# Patient Record
Sex: FEMALE | Race: BLACK OR AFRICAN AMERICAN | NOT HISPANIC OR LATINO | Employment: UNEMPLOYED | URBAN - METROPOLITAN AREA
[De-identification: names, ages, dates, MRNs, and addresses within clinical notes are randomized per-mention and may not be internally consistent; named-entity substitution may affect disease eponyms.]

---

## 2017-03-23 ENCOUNTER — HOSPITAL ENCOUNTER (EMERGENCY)
Facility: HOSPITAL | Age: 37
Discharge: HOME | End: 2017-03-23
Attending: EMERGENCY MEDICINE
Payer: MEDICAID

## 2017-03-23 VITALS
HEIGHT: 62 IN | HEART RATE: 64 BPM | RESPIRATION RATE: 19 BRPM | OXYGEN SATURATION: 97 % | TEMPERATURE: 98 F | BODY MASS INDEX: 27.6 KG/M2 | SYSTOLIC BLOOD PRESSURE: 164 MMHG | DIASTOLIC BLOOD PRESSURE: 89 MMHG | WEIGHT: 150 LBS

## 2017-03-23 DIAGNOSIS — R06.02 SOB (SHORTNESS OF BREATH): ICD-10-CM

## 2017-03-23 DIAGNOSIS — I10 CHRONIC HYPERTENSION: Primary | ICD-10-CM

## 2017-03-23 LAB
ALBUMIN SERPL BCP-MCNC: 3.9 G/DL
ALP SERPL-CCNC: 97 U/L
ALT SERPL W/O P-5'-P-CCNC: 18 U/L
ANION GAP SERPL CALC-SCNC: 11 MMOL/L
AST SERPL-CCNC: 20 U/L
BASOPHILS # BLD AUTO: 0.02 K/UL
BASOPHILS NFR BLD: 0.3 %
BILIRUB SERPL-MCNC: 0.3 MG/DL
BNP SERPL-MCNC: 65 PG/ML
BUN SERPL-MCNC: 8 MG/DL
CALCIUM SERPL-MCNC: 9.7 MG/DL
CHLORIDE SERPL-SCNC: 108 MMOL/L
CO2 SERPL-SCNC: 20 MMOL/L
CREAT SERPL-MCNC: 0.9 MG/DL
D DIMER PPP IA.FEU-MCNC: 1.47 MG/L FEU
DIFFERENTIAL METHOD: ABNORMAL
EOSINOPHIL # BLD AUTO: 0.2 K/UL
EOSINOPHIL NFR BLD: 2.8 %
ERYTHROCYTE [DISTWIDTH] IN BLOOD BY AUTOMATED COUNT: 12.1 %
EST. GFR  (AFRICAN AMERICAN): >60 ML/MIN/1.73 M^2
EST. GFR  (NON AFRICAN AMERICAN): >60 ML/MIN/1.73 M^2
GLUCOSE SERPL-MCNC: 85 MG/DL
HCT VFR BLD AUTO: 38.6 %
HGB BLD-MCNC: 14 G/DL
LYMPHOCYTES # BLD AUTO: 2.2 K/UL
LYMPHOCYTES NFR BLD: 31.5 %
MCH RBC QN AUTO: 32 PG
MCHC RBC AUTO-ENTMCNC: 36.3 %
MCV RBC AUTO: 88 FL
MONOCYTES # BLD AUTO: 0.5 K/UL
MONOCYTES NFR BLD: 7.5 %
NEUTROPHILS # BLD AUTO: 4.1 K/UL
NEUTROPHILS NFR BLD: 57.8 %
PLATELET # BLD AUTO: 242 K/UL
PMV BLD AUTO: 9.5 FL
POTASSIUM SERPL-SCNC: 3.7 MMOL/L
PROT SERPL-MCNC: 7.1 G/DL
RBC # BLD AUTO: 4.38 M/UL
SODIUM SERPL-SCNC: 139 MMOL/L
TROPONIN I SERPL DL<=0.01 NG/ML-MCNC: <0.006 NG/ML
WBC # BLD AUTO: 7.1 K/UL

## 2017-03-23 PROCEDURE — 99284 EMERGENCY DEPT VISIT MOD MDM: CPT | Mod: 25

## 2017-03-23 PROCEDURE — 80053 COMPREHEN METABOLIC PANEL: CPT

## 2017-03-23 PROCEDURE — 84484 ASSAY OF TROPONIN QUANT: CPT

## 2017-03-23 PROCEDURE — 85379 FIBRIN DEGRADATION QUANT: CPT

## 2017-03-23 PROCEDURE — 96374 THER/PROPH/DIAG INJ IV PUSH: CPT

## 2017-03-23 PROCEDURE — 85025 COMPLETE CBC W/AUTO DIFF WBC: CPT

## 2017-03-23 PROCEDURE — 63600175 PHARM REV CODE 636 W HCPCS: Performed by: EMERGENCY MEDICINE

## 2017-03-23 PROCEDURE — 83880 ASSAY OF NATRIURETIC PEPTIDE: CPT

## 2017-03-23 PROCEDURE — 93005 ELECTROCARDIOGRAM TRACING: CPT

## 2017-03-23 PROCEDURE — 25500020 PHARM REV CODE 255: Performed by: EMERGENCY MEDICINE

## 2017-03-23 RX ORDER — LABETALOL 100 MG/1
100 TABLET, FILM COATED ORAL 2 TIMES DAILY
COMMUNITY
End: 2022-07-11 | Stop reason: SDUPTHER

## 2017-03-23 RX ORDER — HYDRALAZINE HYDROCHLORIDE 20 MG/ML
10 INJECTION INTRAMUSCULAR; INTRAVENOUS
Status: COMPLETED | OUTPATIENT
Start: 2017-03-23 | End: 2017-03-23

## 2017-03-23 RX ADMIN — IOHEXOL 100 ML: 350 INJECTION, SOLUTION INTRAVENOUS at 08:03

## 2017-03-23 RX ADMIN — HYDRALAZINE HYDROCHLORIDE 10 MG: 20 INJECTION INTRAMUSCULAR; INTRAVENOUS at 07:03

## 2017-03-23 NOTE — ED TRIAGE NOTES
Pt arrived to Ed with complaints of chest pain, SOB and right sided facial twitching. Pt states she does not always take home medicine labetalol at home. Last time she took medicine was on yesterday

## 2017-03-23 NOTE — ED AVS SNAPSHOT
[facility]  [address] [place] [address]               Elvie Salvador   3/23/2017  6:08 PM   ED    Description:  Female : 1980   Department:  [facility]           Your Care was Coordinated By:     Provider Role From To    Star Gomez MD Attending Provider 17 1823 --      Reason for Visit     Shortness of Breath     Chest Pain           Diagnoses this Visit        Comments    Chronic hypertension    -  Primary     SOB (shortness of breath)           ED Disposition     ED Disposition Condition Comment    Discharge             To Do List           Follow-up Information     Schedule an appointment as soon as possible for a visit with [facility].    Specialty:  Family Medicine    Contact information:    [address], [address]  [facility] [address] [phone]      [facility] [place] [facility]     [facility] Care [facility] -  Assistance  Registered nurses in the [facility] provide clinical advisement, health education, appointment booking, and other advisory services.  Call for this free service at [phone].             Medications           Message regarding Medications     Verify the changes and/or additions to your medication regime listed below are the same as discussed with your clinician today.  If any of these changes or additions are incorrect, please notify your healthcare provider.        These medications were administered today        Dose Freq    hydrALAZINE injection 10 mg 10 mg ED 1 Time    Sig: Inject 0.5 mLs (10 mg total) into the vein ED 1 Time.    Class: Normal    Route: Intravenous    omnipaque 350 iohexol 100 mL 100 mL IMG once as needed    Sig: Inject 100 mLs into the vein ONCE PRN for contrast.    Class: Normal    Route: Intravenous    omnipaque 350 iohexol 350 mg iodine/mL      Notes to Pharmacy: Created by cabinet override      STOP taking these medications   "   fluticasone (FLONASE) 50 mcg/actuation nasal spray 1 spray by Each Nare route 2 (two) times daily as needed for Rhinitis.    methylPREDNISolone (MEDROL DOSEPACK) 4 mg tablet use as directed           Verify that the below list of medications is an accurate representation of the medications you are currently taking.  If none reported, the list may be blank. If incorrect, please contact your healthcare provider. Carry this list with you in case of emergency.           Current Medications     labetalol (NORMODYNE) 100 MG tablet Take 100 mg by mouth 2 (two) times daily.           Clinical Reference Information           Your Vitals Were     BP Pulse Temp Resp Height Weight    156/88 79 98.4 °F (36.9 °C) (Oral) 19 5' 2" (1.575 m) 68 kg (150 lb)    Last Period SpO2 BMI          01/23/2015 99% 27.44 kg/m2        Allergies as of 3/23/2017        Reactions    Ibuprofen Anaphylaxis    Peanut Anaphylaxis    2 years ago last exacerbation, pt states her throat swells but has not been treated in an ER for same.      Immunizations Administered on Date of Encounter - 3/23/2017     None      ED Micro, Lab, POCT     Start Ordered       Status Ordering Provider    03/23/17 1855 03/23/17 1854  Troponin I  STAT      Final result     03/23/17 1855 03/23/17 1854  Brain natriuretic peptide  STAT      Final result     03/23/17 1855 03/23/17 1854  D dimer, quantitative  STAT      Final result     03/23/17 1854 03/23/17 1854  CBC auto differential  STAT      Final result     03/23/17 1854 03/23/17 1854  Comprehensive metabolic panel  STAT      Final result       ED Imaging Orders     Start Ordered       Status Ordering Provider    03/23/17 2007 03/23/17 2006  CTA Chest Non-Coronary - PE Study  1 time imaging      Final result     03/23/17 1855 03/23/17 1854  X-Ray Chest PA And Lateral  1 time imaging      Final result         Discharge Instructions       Take your medications as prescribed.  Follow up with your primary care physician 2-3 " days for further management of your hypertension.  Please refer to the additional material providing further information including when return to the emergency department.    Discharge References/Attachments     HYPERTENSION, ESTABLISHED (ENGLISH)      [employer] Sign-Up     Activating your [facility] account is as easy as 1-2-3!     1) Visit [email or web address], select Sign Up Now, enter this activation code and your date of birth, then select Next.  [email or web address]  Expires: 5/7/2017  9:37 PM      2) Create a username and password to use when you visit [facility] in the future and select a security question in case you lose your password and select Next.    3) Enter your e-mail address and click Sign Up!    Additional Information  If you have questions, please e-mail [email or web address] or call [phone] to talk to our [facility] staff. Remember, [facility] is NOT to be used for urgent needs. For medical emergencies, dial 911.          [facility] complies with applicable Federal civil rights laws and does not discriminate on the basis of race, color, national origin, age, disability, or sex.        Language Assistance Services     ATTENTION: Language assistance services are available, free of charge. Please call [phone].      ATENCIÓN: Si habla español, tiene a gayle disposición servicios gratuitos de asistencia lingüística. Llame al [phone].     CHÚ Ý: N?u b?n nói Ti?ng Vi?t, có các d?ch v? h? tr? ngôn ng? mi?n phí dành cho b?n. G?i s? [phone].

## 2017-03-23 NOTE — ED NOTES
Patient has verified the spelling of their name and  on armband  LOC: The patient is awake, alert, and aware of environment with an appropriate affect, the patient is oriented x 3 and speaking appropriately.   APPEARANCE: Patient resting comfortably and in no acute distress, patient is clean and well groomed, patient's clothing is properly fastened.   SKIN: The skin is warm and dry, color consistent with ethnicity, patient has normal skin turgor and moist mucus membranes, skin intact, no breakdown or bruising noted.   : denies any problems urinating  MUSCULOSKELETAL: Patient moving all extremities spontaneously, right side facial twitching.   RESPIRATORY: Airway is open and patent, respirations are spontaneous, patient has a normal effort and rate, no accessory muscle use noted, bilateral breath sounds clear, complaints of SOB, O2 sats at 100% room air    ABDOMEN: Soft and non tender to palpation, no distention noted, normoactive bowel sounds present in all four quadrants, pt says she usually have problems having bowel movements and uses a stool softner.   CARDIAC: Normal rate and rhythm, no peripheral edema noted, less then 3 second capillary refill, complaints of  chest pain, denies any nausea vomiting

## 2017-03-24 NOTE — ED PROVIDER NOTES
"Encounter Date: 3/23/2017       History     Chief Complaint   Patient presents with    Shortness of Breath     accompanied by "heart racing"; x1 week; denies recent illness;     Chest Pain     x1 week; left sided; denies radiation     Review of patient's allergies indicates:   Allergen Reactions    Ibuprofen Anaphylaxis    Peanut Anaphylaxis     2 years ago last exacerbation, pt states her throat swells but has not been treated in an ER for same.     HPI Comments: CHIEF COMPLAINT: Shortness of breath    HISTORY OF PRESENT ILLNESS: This is a 36-year-old -American female with a history of hypertension who is noncompliant with her medications or presents to the emergency department today with complaint of shortness of breath.  She reports she's been feeling increasingly short of breath for some time now.  She reports that it tends to wake her up from sleep.  It will, on throughout the day.  She has had no recent cough.  No sputum production.  No hemoptysis.  No recent long travel.  No unilateral leg swelling.  No chest pain.  No palpitations.  Her blood pressure is significantly high here today in the emergency department.  She does report that she only takes her medications when she feels that she needs it.      REVIEW OF SYSTEMS:  Constitutional: No fever, no chills.  Eyes: No discharge. No pain.  HENT: No nasal drainage. No ear ache. No sore throat.  Cardiovascular: See above.  Respiratory: See above.  Gastrointestinal: No abdominal pain, no vomiting. No diarrhea.  Genitourinary: No hematuria, dysuria, urgency.  Musculoskeletal: No back pain.  Skin: No rashes, no lesions.  Neurological: No headache, no focal weakness.    ALLERGIES reviewed  Family history reviewed  Home medications reviewed  Problem list reviewed        The history is provided by the patient and the spouse.     Past Medical History:   Diagnosis Date    Abnormal Pap smear     Hypertension     Sickle cell anemia     trait     Past " Surgical History:   Procedure Laterality Date    HYSTERECTOMY       History reviewed. No pertinent family history.  Social History   Substance Use Topics    Smoking status: Current Every Day Smoker    Smokeless tobacco: Never Used    Alcohol use Yes     Review of Systems   All other systems reviewed and are negative.      Physical Exam   Initial Vitals   BP Pulse Resp Temp SpO2   03/23/17 1802 03/23/17 1802 03/23/17 1802 03/23/17 1802 03/23/17 1802   227/103 70 20 98.2 °F (36.8 °C) 99 %     Physical Exam    Nursing note and vitals reviewed.  Constitutional: She appears well-developed and well-nourished.   HENT:   Head: Normocephalic and atraumatic.   Nose: Nose normal.   Mouth/Throat: Oropharynx is clear and moist.   Eyes: Conjunctivae and EOM are normal. Pupils are equal, round, and reactive to light. No scleral icterus.   Neck: Normal range of motion. Neck supple. No JVD present.   Cardiovascular: Normal rate, regular rhythm, normal heart sounds and intact distal pulses. Exam reveals no gallop and no friction rub.    No murmur heard.  Pulmonary/Chest: Breath sounds normal. No stridor. No respiratory distress. She has no wheezes. She exhibits no tenderness.   Abdominal: Soft. Bowel sounds are normal. She exhibits no distension and no mass. There is no tenderness. There is no rebound and no guarding.   Musculoskeletal: Normal range of motion. She exhibits no edema or tenderness.   Neurological: She is alert and oriented to person, place, and time. She has normal strength. No cranial nerve deficit.   Skin: Skin is warm and dry. No rash noted. No pallor.   Psychiatric: She has a normal mood and affect. Thought content normal.         ED Course   Procedures  Labs Reviewed   CBC W/ AUTO DIFFERENTIAL - Abnormal; Notable for the following:        Result Value    MCH 32.0 (*)     MCHC 36.3 (*)     All other components within normal limits   COMPREHENSIVE METABOLIC PANEL - Abnormal; Notable for the following:     CO2 20  (*)     All other components within normal limits   D DIMER, QUANTITATIVE - Abnormal; Notable for the following:     D-Dimer 1.47 (*)     All other components within normal limits   TROPONIN I   B-TYPE NATRIURETIC PEPTIDE     EKG Readings: (Independently Interpreted)   Initial Reading: No STEMI.   Rate 63, normal sinus rhythm with sinus arrhythmia, normal axis, no ST elevations, no T-wave inversions       X-Rays: Other Radiology Reports: Images reviewed by myself, read by radiology,     Imaging Results         CTA Chest Non-Coronary - PE Study (Final result)    Abnormal Result time:  03/23/17 20:36:55    Final result by Lisa Francisco MD (03/23/17 20:36:55)    Impression:        1. No acute cardiopulmonary abnormality identified.  Specifically, negative pulmonary embolus study.    2. Mediastinal and right hilar nonspecific lymphadenopathy. Correlate clinically. Further evaluation/followup as warranted.              EPIC notification system was activated.      Electronically signed by: LISA FRANCISCO MD, MD  Date:     03/23/17  Time:    20:36     Narrative:    Chest CT with contrast.    Comparison: Chest radiograph earlier same date and a CT thorax 7/7/10    Technique: 1.25 mm axial images of the chest were obtained after the administration of 100 cc of Omnipaque 350 IV contrast.  This is a PE protocol.    Results:Timing of contrast bolus is adequate. No evidence of pulmonary infarction or pulmonary thromboembolism.    There is a left sided arch.  No evidence for aneurysm or dissection.  The heart is normal in size without significant pericardial fluid.  No evidence for cardiac thrombus.  The pulmonary trunk and bilateral main pulmonary arteries are normal in size.    The esophagus appears normal in caliber and contour along its course.  No axillary or left hilar lymphadenopathy. A few scattered mildly enlarged mediastinal lymph nodes measuring up to 10 mm at the AP window. Solitary enlarged right hilar lymph node  measuring up to 15 mm.    The trachea and major bronchi are patent. Lungs are symmetrically normally inflated and clear. No pleural effusion or pneumothorax.    The structures at the base of the neck are without significant abnormality.  The extrathoracic soft tissues and the images of the upper abdomen are without significant abnormality.    Included osseous structures appear intact.            X-Ray Chest PA And Lateral (Final result) Result time:  03/23/17 19:33:25    Final result by Roni Robles MD (03/23/17 19:33:25)    Impression:         No acute findings in the chest.          Electronically signed by: RONI ROBLES MD  Date:     03/23/17  Time:    19:33     Narrative:    Chest PA and Lateral    Indication:.    Comparison:July 7, 2010.    Findings:     The cardiomediastinal silhouette is within normal limits.  There is no pleural effusion.  The trachea is midline.  The lungs are symmetrically expanded bilaterally without evidence of acute parenchymal process.  There is no pneumothorax.  The osseous structures are unremarkable.              Medical Decision Making:   Initial Assessment:   Will assess the patient for shortness of breath including labs, chest x-ray, depending on the results of her d-dimer possible CTA.  Differential Diagnosis:   Bronchitis, pneumonia, reactive airway, asthma, COPD, congestive heart failure, PE, MI.  Independently Interpreted Test(s):   I have ordered and independently interpreted EKG Reading(s) - see prior notes  Clinical Tests:   Lab Tests: Ordered and Reviewed  Radiological Study: Ordered and Reviewed  Medical Tests: Ordered and Reviewed  ED Management:  The pt presents with shortness of breath and elevated BP, she is noncompliant with her BP meds. Her workup today does not show any sign of ACS, PE, dissection. We have obtained improvement in her blood pressure following her medications.  Had a long discussion with her about the need to be compliant with her  medications.  We will also discussed follow-up with primary care.  She is assigned to a primary care physician however have also given her resources for [facility] family medicine in case she for she cannot follow up with her primary care physician.  The patient is comfortable going home and comfortable with this plan.  Discharge                   ED Course    the patient is feeling improved.  Her blood pressure is improving following medications.  Her d-dimer is significantly elevated now getting a CTA to rule out pulmonary embolism.  I discussed this with the patient and she agrees.    Patient is still feeling improved.  Blood pressure still improving.  CTA negative.  I discussed results of the workup with the patient and the need for follow-up with primary care for further blood pressure management.  The patient understands and will comply.  She is comfortable going home at this time.  Clinical Impression:   The primary encounter diagnosis was Chronic hypertension. A diagnosis of SOB (shortness of breath) was also pertinent to this visit.    Disposition:   Disposition: Discharged  Condition: Stable       Star Gomez MD  03/23/17 [id number]

## 2017-03-24 NOTE — DISCHARGE INSTRUCTIONS
Take your medications as prescribed.  Follow up with your primary care physician 2-3 days for further management of your hypertension.  Please refer to the additional material providing further information including when return to the emergency department.

## 2018-07-17 ENCOUNTER — HOSPITAL ENCOUNTER (EMERGENCY)
Facility: HOSPITAL | Age: 38
Discharge: HOME | End: 2018-07-17
Attending: EMERGENCY MEDICINE | Admitting: EMERGENCY MEDICINE
Payer: MEDICAID

## 2018-07-17 VITALS
WEIGHT: 145 LBS | DIASTOLIC BLOOD PRESSURE: 76 MMHG | TEMPERATURE: 97 F | OXYGEN SATURATION: 100 % | RESPIRATION RATE: 16 BRPM | HEART RATE: 69 BPM | BODY MASS INDEX: 26.68 KG/M2 | SYSTOLIC BLOOD PRESSURE: 136 MMHG | HEIGHT: 62 IN

## 2018-07-17 DIAGNOSIS — G43.909 MIGRAINE WITHOUT STATUS MIGRAINOSUS, NOT INTRACTABLE, UNSPECIFIED MIGRAINE TYPE: Primary | ICD-10-CM

## 2018-07-17 PROCEDURE — 96375 TX/PRO/DX INJ NEW DRUG ADDON: CPT

## 2018-07-17 PROCEDURE — 99284 EMERGENCY DEPT VISIT MOD MDM: CPT | Mod: 25

## 2018-07-17 PROCEDURE — 96374 THER/PROPH/DIAG INJ IV PUSH: CPT

## 2018-07-17 PROCEDURE — 63600175 PHARM REV CODE 636 W HCPCS: Performed by: EMERGENCY MEDICINE

## 2018-07-17 RX ORDER — BUTALBITAL, ACETAMINOPHEN AND CAFFEINE 50; 325; 40 MG/1; MG/1; MG/1
1 TABLET ORAL EVERY 4 HOURS PRN
Qty: 20 TABLET | Refills: 0 | Status: SHIPPED | OUTPATIENT
Start: 2018-07-17 | End: 2018-08-16

## 2018-07-17 RX ORDER — SUMATRIPTAN 50 MG/1
50 TABLET, FILM COATED ORAL EVERY 4 HOURS PRN
Qty: 20 TABLET | Refills: 0 | Status: SHIPPED | OUTPATIENT
Start: 2018-07-17 | End: 2018-08-16

## 2018-07-17 RX ORDER — DIPHENHYDRAMINE HYDROCHLORIDE 50 MG/ML
25 INJECTION INTRAMUSCULAR; INTRAVENOUS
Status: COMPLETED | OUTPATIENT
Start: 2018-07-17 | End: 2018-07-17

## 2018-07-17 RX ORDER — PROCHLORPERAZINE EDISYLATE 5 MG/ML
10 INJECTION INTRAMUSCULAR; INTRAVENOUS
Status: COMPLETED | OUTPATIENT
Start: 2018-07-17 | End: 2018-07-17

## 2018-07-17 RX ADMIN — PROCHLORPERAZINE EDISYLATE 10 MG: 5 INJECTION INTRAMUSCULAR; INTRAVENOUS at 06:07

## 2018-07-17 RX ADMIN — DIPHENHYDRAMINE HYDROCHLORIDE 25 MG: 50 INJECTION, SOLUTION INTRAMUSCULAR; INTRAVENOUS at 06:07

## 2018-07-17 NOTE — ED PROVIDER NOTES
"Encounter Date: 7/17/2018    SCRIBE #1 NOTE: I, Mariely Dave, am scribing for, and in the presence of,  Dr. Fitzgerald. I have scribed the entire note.       History     Chief Complaint   Patient presents with    Headache     headache that began 5 days ago in back of scalp and radiates to the front right eye, , dizzy with nausea,  states  " im losing my train of thought " since saturday      Time seen by the provider: 6:08 PM    This is a 37 y.o. female with a past medical history of Abnormal Pap smear; Hypertension; and Sickle cell anemia who presents to the Emergency Department with a headache x 5 days. The headache is described as constant, sharp, progressively worsening and severe. Symptoms are associated with dizziness, "losing trains of thought," and nausea. This headache is different than any prior headaches she's ever had. Pt denies fever or vomiting.  Symptoms are relieved by nothing; she has tried Tylenol and Fioricet. Patient has no prior history of similar symptoms. She reports a family history of aneurysm and brain tumors. Pt has a past surgical history that includes Hysterectomy.        The history is provided by the patient.     Review of patient's allergies indicates:   Allergen Reactions    Ibuprofen Anaphylaxis    Peanut Anaphylaxis     2 years ago last exacerbation, pt states her throat swells but has not been treated in an ER for same.     Past Medical History:   Diagnosis Date    Abnormal Pap smear     Hypertension     Sickle cell anemia     trait     Past Surgical History:   Procedure Laterality Date    HYSTERECTOMY       No family history on file.  Social History   Substance Use Topics    Smoking status: Current Every Day Smoker    Smokeless tobacco: Never Used    Alcohol use Yes     Review of Systems   Constitutional: Negative for activity change, appetite change, chills, diaphoresis, fatigue and fever.   HENT: Negative for sore throat.    Respiratory: Negative for cough, chest " "tightness and shortness of breath.    Cardiovascular: Negative for chest pain and palpitations.   Gastrointestinal: Positive for nausea. Negative for abdominal distention, abdominal pain, diarrhea and vomiting.   Endocrine: Negative for polydipsia and polyphagia.   Genitourinary: Negative for difficulty urinating, dysuria and flank pain.   Musculoskeletal: Positive for neck stiffness. Negative for arthralgias.   Skin: Negative for pallor and rash.   Neurological: Positive for dizziness and headaches.        Positive for "losing train of thought."   Psychiatric/Behavioral: Negative for confusion.   All other systems reviewed and are negative.      Physical Exam     Initial Vitals [07/17/18 1707]   BP Pulse Resp Temp SpO2   (!) 147/97 89 16 97.3 °F (36.3 °C) 99 %      MAP       --         Physical Exam    Nursing note and vitals reviewed.  Constitutional: She appears well-developed and well-nourished. She is not diaphoretic. No distress.   HENT:   Head: Normocephalic and atraumatic.   Mouth/Throat: Oropharynx is clear and moist.   Eyes: Conjunctivae and EOM are normal. Pupils are equal, round, and reactive to light. No scleral icterus.   Neck: Normal range of motion. Neck supple.   No nuchal rigidity   Cardiovascular: Normal rate, regular rhythm and normal heart sounds.   Pulmonary/Chest: Breath sounds normal. No respiratory distress.   Abdominal: Soft. Bowel sounds are normal. There is no tenderness.   Musculoskeletal: Normal range of motion. She exhibits no edema or tenderness.   Neurological: She is alert and oriented to person, place, and time. No cranial nerve deficit.   Moving all extremities   Skin: Skin is warm and dry. No rash noted. No erythema.   Psychiatric: She has a normal mood and affect. Her behavior is normal.         ED Course   Procedures  Labs Reviewed - No data to display       Imaging Results          CT Head Without Contrast (Final result)  Result time 07/17/18 19:27:22    Final result by Jarocho " MARCY Francisco MD (07/17/18 19:27:22)                 Impression:      No acute intracranial abnormality.      Electronically signed by: Jarocho Francisco MD  Date:    07/17/2018  Time:    19:27             Narrative:    EXAMINATION:  CT HEAD WITHOUT CONTRAST    CLINICAL HISTORY:  Headache, acute, norm neuro exam;    TECHNIQUE:  Low dose axial CT images obtained throughout the head without intravenous contrast. Sagittal and coronal reconstructions were performed.    COMPARISON:  None.    FINDINGS:  Intracranial compartment:    Ventricles and sulci are normal in size for age without evidence of hydrocephalus. No extra-axial blood or fluid collections.    The brain parenchyma appears normal. No parenchymal mass, hemorrhage, edema or major vascular distribution infarct.    Skull/extracranial contents (limited evaluation): No fracture. Mastoid air cells and paranasal sinuses are essentially clear.  Visualized portions of the orbits are within normal limits.                                 Medical Decision Making:   ED Management:  Pt has normal ct. Feels better after compazine. Will dc w fioricet and f/u w neuro              Attending Attestation:           Physician Attestation for Scribe:  Physician Attestation Statement for Scribe #1: I, Parrish Fitzgerald, reviewed documentation, as scribed by Mariely Dave in my presence, and it is both accurate and complete.                    Clinical Impression:     1. Migraine without status migrainosus, not intractable, unspecified migraine type            Disposition:   Disposition: Discharged  Condition: Stable                        Parrish Fitzgerald MD  07/17/18 1959

## 2018-07-17 NOTE — ED NOTES
APPEARANCE: Alert, oriented and in no acute distress.  CARDIAC: Normal rate and rhythm, no murmur heard.   PERIPHERAL VASCULAR: peripheral pulses present. Normal cap refill. No edema. Warm to touch.    RESPIRATORY:Normal rate and effort, breath sounds clear bilaterally throughout chest. Respirations are equal and unlabored no obvious signs of distress.  GASTRO: soft, bowel sounds normal, no tenderness, no abdominal distention.  MUSC: Full ROM. No bony tenderness or soft tissue tenderness. No obvious deformity.  SKIN: Skin is warm and dry, normal skin turgor, mucous membranes moist.  NEURO: 5/5 strength major flexors/extensors bilaterally. Sensory intact to light touch bilaterally. Clarence coma scale: eyes open spontaneously-4, oriented & converses-5, obeys commands-6. No neurological abnormalities. R sided headache, endorses dizziness, blurred vision  MENTAL STATUS: awake, alert and aware of environment.  EYE: PERRL, both eyes: pupils brisk and reactive to light. Normal size.  ENT: EARS: no obvious drainage. NOSE: no active bleeding.

## 2018-07-17 NOTE — ED NOTES
"When asked for urine sample for UPT, pt replied "I already told them I had a hysterectomy." pt endorses total hysterectomy in the past. Amilcar MAGANA aware. CT called and notified. Pt medicated for headache. Will continue to monitor pt.   "

## 2018-07-17 NOTE — ED NOTES
C/o worsening headache since Thursday, endorses improved somewhat with BP meds, now c/o R sided headache starting from neck and radiating towards forehead, pt endorses more forgetful, dizziness, and blurred vision. Denies numbness/tingling,  equal bilaterally, gait steady to room. Will continue to monitor pt.

## 2018-07-18 NOTE — ED NOTES
Pt appears to be resting . Respirations even and unlabored. Equal rise and fall of chest noted. Skin warm and dry. Pt easily aroused to verbal stimulation. Will continue to monitor.

## 2018-12-11 DIAGNOSIS — Z12.31 VISIT FOR SCREENING MAMMOGRAM: Primary | ICD-10-CM

## 2018-12-21 ENCOUNTER — HOSPITAL ENCOUNTER (OUTPATIENT)
Dept: RADIOLOGY | Facility: HOSPITAL | Age: 38
Discharge: HOME | End: 2018-12-21
Attending: FAMILY MEDICINE
Payer: MEDICAID

## 2018-12-21 DIAGNOSIS — Z12.31 VISIT FOR SCREENING MAMMOGRAM: ICD-10-CM

## 2018-12-21 PROCEDURE — 77067 SCR MAMMO BI INCL CAD: CPT | Mod: 26,,, | Performed by: RADIOLOGY

## 2018-12-21 PROCEDURE — 77067 SCR MAMMO BI INCL CAD: CPT | Mod: TC

## 2022-07-11 ENCOUNTER — HOSPITAL ENCOUNTER (EMERGENCY)
Facility: HOSPITAL | Age: 42
Discharge: HOME | End: 2022-07-11
Attending: EMERGENCY MEDICINE
Payer: MEDICAID

## 2022-07-11 VITALS
SYSTOLIC BLOOD PRESSURE: 203 MMHG | TEMPERATURE: 99 F | BODY MASS INDEX: 27.05 KG/M2 | OXYGEN SATURATION: 99 % | HEIGHT: 62 IN | DIASTOLIC BLOOD PRESSURE: 98 MMHG | HEART RATE: 62 BPM | RESPIRATION RATE: 18 BRPM | WEIGHT: 147 LBS

## 2022-07-11 DIAGNOSIS — R06.02 SHORTNESS OF BREATH: ICD-10-CM

## 2022-07-11 DIAGNOSIS — Z91.148 NONCOMPLIANCE WITH MEDICATIONS: ICD-10-CM

## 2022-07-11 DIAGNOSIS — I10 BENIGN ESSENTIAL HTN: Primary | ICD-10-CM

## 2022-07-11 DIAGNOSIS — R07.9 CHEST PAIN: ICD-10-CM

## 2022-07-11 LAB
ALBUMIN SERPL BCP-MCNC: 4.1 G/DL (ref 3.5–5.2)
ALP SERPL-CCNC: 106 U/L (ref 55–135)
ALT SERPL W/O P-5'-P-CCNC: 20 U/L (ref 10–44)
ANION GAP SERPL CALC-SCNC: 12 MMOL/L (ref 8–16)
AST SERPL-CCNC: 24 U/L (ref 10–40)
BASOPHILS # BLD AUTO: 0.04 K/UL (ref 0–0.2)
BASOPHILS NFR BLD: 0.6 % (ref 0–1.9)
BILIRUB SERPL-MCNC: 0.4 MG/DL (ref 0.1–1)
BNP SERPL-MCNC: 19 PG/ML (ref 0–99)
BUN SERPL-MCNC: 9 MG/DL (ref 6–20)
CALCIUM SERPL-MCNC: 9.6 MG/DL (ref 8.7–10.5)
CHLORIDE SERPL-SCNC: 107 MMOL/L (ref 95–110)
CO2 SERPL-SCNC: 21 MMOL/L (ref 23–29)
CREAT SERPL-MCNC: 0.8 MG/DL (ref 0.5–1.4)
DIFFERENTIAL METHOD: ABNORMAL
EOSINOPHIL # BLD AUTO: 0.4 K/UL (ref 0–0.5)
EOSINOPHIL NFR BLD: 6 % (ref 0–8)
ERYTHROCYTE [DISTWIDTH] IN BLOOD BY AUTOMATED COUNT: 11.4 % (ref 11.5–14.5)
EST. GFR  (AFRICAN AMERICAN): >60 ML/MIN/1.73 M^2
EST. GFR  (NON AFRICAN AMERICAN): >60 ML/MIN/1.73 M^2
GLUCOSE SERPL-MCNC: 90 MG/DL (ref 70–110)
HCT VFR BLD AUTO: 40.3 % (ref 37–48.5)
HGB BLD-MCNC: 14.2 G/DL (ref 12–16)
IMM GRANULOCYTES # BLD AUTO: 0.01 K/UL (ref 0–0.04)
IMM GRANULOCYTES NFR BLD AUTO: 0.1 % (ref 0–0.5)
LYMPHOCYTES # BLD AUTO: 2.2 K/UL (ref 1–4.8)
LYMPHOCYTES NFR BLD: 32.9 % (ref 18–48)
MCH RBC QN AUTO: 32.5 PG (ref 27–31)
MCHC RBC AUTO-ENTMCNC: 35.2 G/DL (ref 32–36)
MCV RBC AUTO: 92 FL (ref 82–98)
MONOCYTES # BLD AUTO: 0.4 K/UL (ref 0.3–1)
MONOCYTES NFR BLD: 5.8 % (ref 4–15)
NEUTROPHILS # BLD AUTO: 3.6 K/UL (ref 1.8–7.7)
NEUTROPHILS NFR BLD: 54.6 % (ref 38–73)
NRBC BLD-RTO: 0 /100 WBC
PLATELET # BLD AUTO: 276 K/UL (ref 150–450)
PMV BLD AUTO: 8.9 FL (ref 9.2–12.9)
POTASSIUM SERPL-SCNC: 3.5 MMOL/L (ref 3.5–5.1)
PROT SERPL-MCNC: 7.6 G/DL (ref 6–8.4)
RBC # BLD AUTO: 4.37 M/UL (ref 4–5.4)
SODIUM SERPL-SCNC: 140 MMOL/L (ref 136–145)
TROPONIN I SERPL DL<=0.01 NG/ML-MCNC: <0.006 NG/ML (ref 0–0.03)
TSH SERPL DL<=0.005 MIU/L-ACNC: 1.89 UIU/ML (ref 0.4–4)
WBC # BLD AUTO: 6.68 K/UL (ref 3.9–12.7)

## 2022-07-11 PROCEDURE — 99285 EMERGENCY DEPT VISIT HI MDM: CPT | Mod: 25

## 2022-07-11 PROCEDURE — 83880 ASSAY OF NATRIURETIC PEPTIDE: CPT | Performed by: NURSE PRACTITIONER

## 2022-07-11 PROCEDURE — 93010 ELECTROCARDIOGRAM REPORT: CPT | Mod: ,,, | Performed by: INTERNAL MEDICINE

## 2022-07-11 PROCEDURE — 85025 COMPLETE CBC W/AUTO DIFF WBC: CPT | Performed by: NURSE PRACTITIONER

## 2022-07-11 PROCEDURE — 25000003 PHARM REV CODE 250: Performed by: EMERGENCY MEDICINE

## 2022-07-11 PROCEDURE — 84443 ASSAY THYROID STIM HORMONE: CPT | Performed by: NURSE PRACTITIONER

## 2022-07-11 PROCEDURE — 80053 COMPREHEN METABOLIC PANEL: CPT | Performed by: NURSE PRACTITIONER

## 2022-07-11 PROCEDURE — 84484 ASSAY OF TROPONIN QUANT: CPT | Performed by: NURSE PRACTITIONER

## 2022-07-11 PROCEDURE — 93005 ELECTROCARDIOGRAM TRACING: CPT

## 2022-07-11 PROCEDURE — 93010 EKG 12-LEAD: ICD-10-PCS | Mod: ,,, | Performed by: INTERNAL MEDICINE

## 2022-07-11 RX ORDER — FUROSEMIDE 20 MG/1
20 TABLET ORAL DAILY
Qty: 30 TABLET | Refills: 0 | Status: SHIPPED | OUTPATIENT
Start: 2022-07-11 | End: 2022-08-10

## 2022-07-11 RX ORDER — FUROSEMIDE 20 MG/1
20 TABLET ORAL
Status: COMPLETED | OUTPATIENT
Start: 2022-07-11 | End: 2022-07-11

## 2022-07-11 RX ORDER — LABETALOL 100 MG/1
100 TABLET, FILM COATED ORAL
Status: COMPLETED | OUTPATIENT
Start: 2022-07-11 | End: 2022-07-11

## 2022-07-11 RX ORDER — LABETALOL HYDROCHLORIDE 5 MG/ML
10 INJECTION, SOLUTION INTRAVENOUS
Status: DISCONTINUED | OUTPATIENT
Start: 2022-07-11 | End: 2022-07-11

## 2022-07-11 RX ORDER — LABETALOL 100 MG/1
100 TABLET, FILM COATED ORAL 2 TIMES DAILY
Qty: 60 TABLET | Refills: 0 | Status: SHIPPED | OUTPATIENT
Start: 2022-07-11 | End: 2022-08-10

## 2022-07-11 RX ADMIN — FUROSEMIDE 20 MG: 20 TABLET ORAL at 07:07

## 2022-07-11 RX ADMIN — LABETALOL HYDROCHLORIDE 100 MG: 100 TABLET, FILM COATED ORAL at 07:07

## 2022-07-11 NOTE — Clinical Note
"Elvie Loeracarol Salvador was seen and treated in our emergency department on 7/11/2022.  She may return to work on 07/13/2022.       If you have any questions or concerns, please don't hesitate to call.      Frederic Wheat MD"

## 2022-07-12 NOTE — ED PROVIDER NOTES
"Encounter Date: 7/11/2022       History     Chief Complaint   Patient presents with    Chest Pain     Left-sided, anterior, localized CP that started today. 8/10 pain. NO BP med taken today.     Shortness of Breath     At rest x2 days.     Headache     Generalized HA that started today . No visual disturbances.      Elvie Salvador is a 41 y.o. female who  has a past medical history of Abnormal Pap smear, Hypertension, and Sickle cell anemia.    The patient presents to the ED due to multiple concerns.  She describes chest pain, shortness of breath, palpitations, headache, and elevated blood pressure that has been ongoing for the last several days.  She admits she has been feeling more stressed lately and describes a "situation" that required her to suddenly move.  As result, she has been without her blood pressure medications for the last 5 months.  She had a small amount of labetalol, so she has been taking it only once a day instead of twice a day as prescribed.  She has been out of furosemide.  She tried to take a dose of her anxiety medication today, but she states it did not help her symptoms.  She denies any vision changes, focal weakness/numbness, fever, cough, fall/syncope, or any other concerns.        Review of patient's allergies indicates:   Allergen Reactions    Ibuprofen Anaphylaxis    Peanut Anaphylaxis     2 years ago last exacerbation, pt states her throat swells but has not been treated in an ER for same.     Past Medical History:   Diagnosis Date    Abnormal Pap smear     Hypertension     Sickle cell anemia     trait     Past Surgical History:   Procedure Laterality Date    HYSTERECTOMY      OOPHORECTOMY       No family history on file.  Social History     Tobacco Use    Smoking status: Current Every Day Smoker    Smokeless tobacco: Never Used   Substance Use Topics    Alcohol use: Yes    Drug use: No     Review of Systems   Constitutional: Negative for chills and fever.   HENT: " Negative for sore throat.    Respiratory: Positive for shortness of breath. Negative for cough.    Cardiovascular: Positive for chest pain and palpitations.   Gastrointestinal: Negative for nausea and vomiting.   Genitourinary: Negative for dysuria, frequency and urgency.   Musculoskeletal: Negative for back pain.   Skin: Negative for rash and wound.   Neurological: Negative for syncope and weakness.   Hematological: Does not bruise/bleed easily.   Psychiatric/Behavioral: Negative for agitation, behavioral problems and confusion. The patient is nervous/anxious.        Physical Exam     Initial Vitals [07/11/22 1740]   BP Pulse Resp Temp SpO2   (S) (!) 235/106 82 20 99.1 °F (37.3 °C) 99 %      MAP       --         Physical Exam    Nursing note and vitals reviewed.  Constitutional: She appears well-developed and well-nourished. She is not diaphoretic. No distress.   Well-appearing, no distress.   HENT:   Head: Normocephalic and atraumatic.   Mouth/Throat: Oropharynx is clear and moist.   Eyes: EOM are normal. Pupils are equal, round, and reactive to light.   Neck: No tracheal deviation present.   Cardiovascular: Normal rate, regular rhythm, normal heart sounds and intact distal pulses.   Pulmonary/Chest: Breath sounds normal. No stridor. No respiratory distress. She has no wheezes.   Abdominal: Abdomen is soft. Bowel sounds are normal. She exhibits no distension and no mass. There is no abdominal tenderness.   Musculoskeletal:         General: No edema. Normal range of motion.     Neurological: She is alert and oriented to person, place, and time. She has normal strength. No cranial nerve deficit or sensory deficit.   Skin: Skin is warm and dry. Capillary refill takes less than 2 seconds. No pallor.   Psychiatric: She has a normal mood and affect. Her behavior is normal. Thought content normal.         ED Course   Procedures  Labs Reviewed   CBC W/ AUTO DIFFERENTIAL - Abnormal; Notable for the following components:        Result Value    MCH 32.5 (*)     RDW 11.4 (*)     MPV 8.9 (*)     All other components within normal limits   COMPREHENSIVE METABOLIC PANEL - Abnormal; Notable for the following components:    CO2 21 (*)     All other components within normal limits   TROPONIN I   B-TYPE NATRIURETIC PEPTIDE   TSH     EKG Readings: (Independently Interpreted)   Initial Reading: No STEMI. Previous EKG: Compared with most recent EKG Rhythm: Normal Sinus Rhythm.   Normal sinus rhythm, rate 75, no ST changes or evidence of ischemia, normal intervals.  Stable from March 2017.        Imaging Results          X-Ray Chest PA And Lateral (Final result)  Result time 07/11/22 20:08:31    Final result by Jacky Lees MD (07/11/22 20:08:31)                 Impression:      No acute process.      Electronically signed by: Jacky Lees MD  Date:    07/11/2022  Time:    20:08             Narrative:    EXAMINATION:  XR CHEST PA AND LATERAL    CLINICAL HISTORY:  Chest Pain;    TECHNIQUE:  PA and lateral views of the chest were performed.    COMPARISON:  03/23/2017.    FINDINGS:  Monitoring EKG leads are present.  The trachea is unremarkable.  The cardiomediastinal silhouette is within normal limits.  The hemidiaphragms are unremarkable.  There are no pleural effusions.  There is no evidence of a pneumothorax.  There is no evidence of pneumomediastinum.  No airspace opacity is present.  The osseous structures are unremarkable.                                 Medications   furosemide tablet 20 mg (20 mg Oral Given 7/11/22 1959)   labetaloL tablet 100 mg (100 mg Oral Given 7/11/22 1959)     Medical Decision Making:   Initial Assessment:   41-year-old female with history of hypertension presents to ER with multiple complaints including chest pain, shortness of breath, headache, palpitations over the last several days.  Not compliant with medications recently.  Hypertensive on arrival, vitals otherwise reassuring.  Exam benign.  Will obtain cardiac  evaluation, labs, CXR, give home medications, and reassess.  Differential Diagnosis:   Differential Diagnosis includes, but is not limited to:  Hypertensive encephalopathy, CVA/TIA, intracranial hemorrhage, MI/ACS, aortic/carotid artery dissection, AAA, hypertensive nephropathy, medication non-compliance, anxiety, drug abuse/intoxication, essential hypertension    Clinical Tests:   Lab Tests: Ordered and Reviewed  Radiological Study: Reviewed and Ordered  Medical Tests: Reviewed and Ordered  ED Management:  After complete evaluation, including thorough history and physical exam, the patient was found to have asymptomatic elevated blood pressure, likely secondary to chronic uncontrolled HTN and medication non-compliance.   EKG without ischemia, labs unremarkable, troponin negative.     The patient's symptoms are not consistent with SAH/intracranial bleed. Physical exam is benign without focal weakness, sensory deficit, or cerebellar dysfunction to suggest acute stroke or intracranial mass. There is no meningismus, fever, or other evidence of infection to suggest meningitis/encephalitis. There is no other evidence of end-organ damage consistent with hypertensive urgency/emergency at time of this evaluation. I do not feel further ED evaluation or intervention is warranted. The patient was given home BP medications and BP medications were refilled. The patient was counseled extensively on medication compliance and was instructed to follow-up with a PCP for further management of elevated BP.  Patient states she has an appointment with her PCP tomorrow.      On re-evaluation, the patient's status has improved.  After complete ED evaluation, clinical impression is most consistent with HTN.  PCP follow-up within 2-3 days was recommended.    After taking into careful account the patient's history, physical exam findings, as well as empirical and objective data obtained throughout ED workup, I feel no emergent medical  condition has been identified. No further evaluation or admission was felt to be required, and the patient is stable for discharge from the ED. The patient and any additional family present were updated with test results, overall clinical impression, and recommended further plan of care, including discharge instructions as provided and outpatient follow-up for continued evaluation and management as needed. All questions were answered. The patient expressed understanding and agreed with current plan for discharge and follow-up plan of care. Strict ED return precautions were provided, including return/worsening of current symptoms, new symptoms, or any other concerns.                        Clinical Impression:   Final diagnoses:  [R07.9] Chest pain  [I10] Benign essential HTN (Primary)  [R06.02] Shortness of breath  [Z91.14] Noncompliance with medications          ED Disposition Condition    Discharge Stable        ED Prescriptions     Medication Sig Dispense Start Date End Date Auth. Provider    furosemide (LASIX) 20 MG tablet Take 1 tablet (20 mg total) by mouth once daily. 30 tablet 7/11/2022 8/10/2022 Frederic Wheat MD    labetaloL (NORMODYNE) 100 MG tablet Take 1 tablet (100 mg total) by mouth 2 (two) times daily. 60 tablet 7/11/2022 8/10/2022 Frederic Wheat MD        Follow-up Information     Follow up With Specialties Details Why Contact Info Additional Information    [facility] Family Medicine Schedule an appointment as soon as possible for a visit   [address], [address]  [place] [address] [phone] Please park in [address] or [address] and use [address]ano. Take Medical Office Bldg. elevators.           Frederic Wheat MD  07/11/22 1719

## 2022-07-12 NOTE — ED NOTES
Pt reports total resolution of CP at this time. BP remains elevated at time of discharge, pt is asymptomatic. Home medications given. Dr. Wheat aware of BP, okay to d/c pt home at this time.

## 2022-08-16 DIAGNOSIS — Z12.31 ENCOUNTER FOR SCREENING MAMMOGRAM FOR MALIGNANT NEOPLASM OF BREAST: Primary | ICD-10-CM

## 2023-01-18 ENCOUNTER — HOSPITAL ENCOUNTER (OUTPATIENT)
Dept: RADIOLOGY | Facility: HOSPITAL | Age: 43
Discharge: HOME | End: 2023-01-18
Attending: FAMILY MEDICINE
Payer: MEDICAID

## 2023-01-18 DIAGNOSIS — Z12.31 ENCOUNTER FOR SCREENING MAMMOGRAM FOR MALIGNANT NEOPLASM OF BREAST: ICD-10-CM

## 2023-01-18 PROCEDURE — 77067 MAMMO DIGITAL SCREENING BILAT WITH TOMO: ICD-10-PCS | Mod: 26,,, | Performed by: RADIOLOGY

## 2023-01-18 PROCEDURE — 77067 SCR MAMMO BI INCL CAD: CPT | Mod: 26,,, | Performed by: RADIOLOGY

## 2023-01-18 PROCEDURE — 77063 MAMMO DIGITAL SCREENING BILAT WITH TOMO: ICD-10-PCS | Mod: 26,,, | Performed by: RADIOLOGY

## 2023-01-18 PROCEDURE — 77063 BREAST TOMOSYNTHESIS BI: CPT | Mod: 26,,, | Performed by: RADIOLOGY

## 2023-01-18 PROCEDURE — 77067 SCR MAMMO BI INCL CAD: CPT | Mod: TC

## 2023-04-18 ENCOUNTER — TELEPHONE (OUTPATIENT)
Dept: GASTROENTEROLOGY | Facility: CLINIC | Age: 43
End: 2023-04-18
Payer: MEDICAID

## 2023-04-18 NOTE — TELEPHONE ENCOUNTER
"Attempt to schedule gi clinic appt from [facility] for gerd.  Pt has clinic number, states "will call when I have my work schedule".  "

## 2023-09-01 DIAGNOSIS — Z12.31 SCREENING MAMMOGRAM, ENCOUNTER FOR: Primary | ICD-10-CM

## 2024-07-17 ENCOUNTER — TELEPHONE (OUTPATIENT)
Dept: CARDIOLOGY | Facility: CLINIC | Age: 44
End: 2024-07-17
Payer: MEDICAID

## 2024-07-17 NOTE — TELEPHONE ENCOUNTER
----- Message from Arnold Chapman MA sent at 7/17/2024  4:32 PM CDT -----  Regarding: FW: Referral    ----- Message -----  From: Diane Torres  Sent: 7/17/2024   4:17 PM CDT  To: [facility] Staff  Subject: Referral                                         Good morning,    Dr. Dottie Cash would like to refer the following patient to Dr. Nicole in the Cardiology department. The patients diagnosis is I10 Hypertension. I have scanned the patients referral and records into .     Thanks,    Diane MATSON  [facility]

## 2024-07-28 NOTE — PROGRESS NOTES
"[facility] Cardiology [address]     SUBJECTIVE:     History of Present Illness:  Patient is a 43 y.o. female presents for evaluation. Unclear why Seen by  1/23 for chest pain and hypertension and palpitations ; no real spot for the chest pains but feels like a strain in the heart from left side      Primary Diagnosis:   Hypertension: positive  DM: none  Smoker: positive  Family history of early CAD: none  Heart disease : no MI or heart failure   ROS  Blood pressures are always high - 207 at times  No chest pains  Occasional feeling of her heart beat stopping - worse at night  Also has noted her heart beat fast  No syncope  Occasional shortness of breath: (breathing is off) - sometimes at night  Activity: works 2 jobs: lifting, works in kitchen; does get a little short of breath; sometimes feels a little chest pain     Review of patient's allergies indicates:   Allergen Reactions    Ibuprofen Anaphylaxis    Peanut Anaphylaxis     2 years ago last exacerbation, pt states her throat swells but has not been treated in an ER for same.       Past Medical History:   Diagnosis Date    Abnormal Pap smear     Hypertension     Sickle cell anemia     trait       Past Surgical History:   Procedure Laterality Date    HYSTERECTOMY      OOPHORECTOMY             Past Hospitalization:         Cardiac meds:    Felodipine 10 mg  Lasix 20 mg  Hydralazine 25 mg BID  Labetolol 100 mg BID  Pravastatin 20 mg - not daily use feels bad       OBJECTIVE:     Vital Signs (Most Recent)  Vitals:    07/31/24 1414   BP: (!) 174/117   Pulse: 74   SpO2: 99%   Weight: 73.9 kg (162 lb 14.7 oz)   Height: 5' 2" (1.575 m)         Physical Exam:  Neck: normal carotids, no bruits; normal JVP  Lungs :clear  Heart: RR, normal S1,S2, soft systolic  ejection murmur, no gallops  Abd: no masses; no bruits;   Exts: normal DP and PT pulses bilaterally, normal radials; no edema noted               Labs    2022: TSH normal; CMP normal; CBC normal  Diagnostic " Results:    1.EK: normal   2.Echo: : normal EF; mild AI, TR, mild MR EJH; aortic sclerosis   3. Stress test: : 6 METS; negative EJH - see no imaging for this   4. cath  5. US carotids: : plaque <50%  Chart review:          ASSESSMENT/PLAN:     Hypertension: uncontrolled  Lipids unknown  Carotid plaque  Noncardiac chest pains with negative workup in the past  Palpitations: sounds like extrasystoles    Plan: 1. Needs a statin - talk to primary  2. Increase labetolol 200 mg BID  3. Followup with primary regarding hypertension  4. No need to repeat any cardiac testing  5. Stop smoking  6. Return prn       Alfredo Nicole MD

## 2024-07-31 ENCOUNTER — OFFICE VISIT (OUTPATIENT)
Dept: CARDIOLOGY | Facility: CLINIC | Age: 44
End: 2024-07-31
Payer: MEDICAID

## 2024-07-31 VITALS
BODY MASS INDEX: 29.98 KG/M2 | WEIGHT: 162.94 LBS | OXYGEN SATURATION: 99 % | SYSTOLIC BLOOD PRESSURE: 174 MMHG | HEART RATE: 74 BPM | DIASTOLIC BLOOD PRESSURE: 117 MMHG | HEIGHT: 62 IN

## 2024-07-31 DIAGNOSIS — I10 PRIMARY HYPERTENSION: Primary | ICD-10-CM

## 2024-07-31 PROCEDURE — 3077F SYST BP >= 140 MM HG: CPT | Mod: CPTII,,, | Performed by: INTERNAL MEDICINE

## 2024-07-31 PROCEDURE — 1159F MED LIST DOCD IN RCRD: CPT | Mod: CPTII,,, | Performed by: INTERNAL MEDICINE

## 2024-07-31 PROCEDURE — 3080F DIAST BP >= 90 MM HG: CPT | Mod: CPTII,,, | Performed by: INTERNAL MEDICINE

## 2024-07-31 PROCEDURE — 99999 PR PBB SHADOW E&M-EST. PATIENT-LVL III: CPT | Mod: PBBFAC,,, | Performed by: INTERNAL MEDICINE

## 2024-07-31 PROCEDURE — 99204 OFFICE O/P NEW MOD 45 MIN: CPT | Mod: S$PBB,,, | Performed by: INTERNAL MEDICINE

## 2024-07-31 PROCEDURE — 3008F BODY MASS INDEX DOCD: CPT | Mod: CPTII,,, | Performed by: INTERNAL MEDICINE

## 2024-07-31 PROCEDURE — 1160F RVW MEDS BY RX/DR IN RCRD: CPT | Mod: CPTII,,, | Performed by: INTERNAL MEDICINE

## 2024-07-31 PROCEDURE — 99213 OFFICE O/P EST LOW 20 MIN: CPT | Mod: PBBFAC,PN | Performed by: INTERNAL MEDICINE

## 2024-07-31 RX ORDER — FLUOCINONIDE 0.5 MG/G
1 CREAM TOPICAL 2 TIMES DAILY
COMMUNITY
Start: 2024-07-22

## 2024-07-31 RX ORDER — CEPHALEXIN 500 MG/1
500 CAPSULE ORAL 2 TIMES DAILY
COMMUNITY
Start: 2024-07-18

## 2024-07-31 RX ORDER — ALPRAZOLAM 0.5 MG/1
0.5 TABLET ORAL DAILY PRN
COMMUNITY
Start: 2024-07-03

## 2024-07-31 RX ORDER — HYDRALAZINE HYDROCHLORIDE 25 MG/1
25 TABLET, FILM COATED ORAL 2 TIMES DAILY
COMMUNITY
Start: 2024-07-03

## 2024-07-31 RX ORDER — OMEPRAZOLE 40 MG/1
40 CAPSULE, DELAYED RELEASE ORAL DAILY PRN
COMMUNITY
Start: 2024-07-03

## 2024-07-31 RX ORDER — PRAVASTATIN SODIUM 20 MG/1
20 TABLET ORAL NIGHTLY
COMMUNITY
Start: 2024-07-03

## 2024-07-31 RX ORDER — LABETALOL 200 MG/1
200 TABLET, FILM COATED ORAL EVERY 12 HOURS
Qty: 180 TABLET | Refills: 1 | Status: SHIPPED | OUTPATIENT
Start: 2024-07-31 | End: 2024-08-30

## 2024-07-31 RX ORDER — FELODIPINE 10 MG/1
10 TABLET, EXTENDED RELEASE ORAL DAILY
COMMUNITY
Start: 2024-07-03

## 2024-07-31 RX ORDER — LINACLOTIDE 145 UG/1
145 CAPSULE, GELATIN COATED ORAL
COMMUNITY
Start: 2024-07-30

## 2025-04-29 ENCOUNTER — HOSPITAL ENCOUNTER (EMERGENCY)
Facility: HOSPITAL | Age: 45
Discharge: HOME | End: 2025-04-29
Attending: EMERGENCY MEDICINE
Payer: MEDICAID

## 2025-04-29 VITALS
RESPIRATION RATE: 16 BRPM | WEIGHT: 164 LBS | BODY MASS INDEX: 30.18 KG/M2 | HEIGHT: 62 IN | OXYGEN SATURATION: 99 % | TEMPERATURE: 98 F | DIASTOLIC BLOOD PRESSURE: 105 MMHG | SYSTOLIC BLOOD PRESSURE: 200 MMHG | HEART RATE: 82 BPM

## 2025-04-29 DIAGNOSIS — I10 HYPERTENSION, UNSPECIFIED TYPE: ICD-10-CM

## 2025-04-29 DIAGNOSIS — L02.01 FACIAL ABSCESS: Primary | ICD-10-CM

## 2025-04-29 PROCEDURE — 10160 PNXR ASPIR ABSC HMTMA BULLA: CPT

## 2025-04-29 PROCEDURE — 99283 EMERGENCY DEPT VISIT LOW MDM: CPT | Mod: 25

## 2025-04-29 RX ORDER — SULFAMETHOXAZOLE AND TRIMETHOPRIM 800; 160 MG/1; MG/1
1 TABLET ORAL 2 TIMES DAILY
Qty: 14 TABLET | Refills: 0 | Status: SHIPPED | OUTPATIENT
Start: 2025-04-29 | End: 2025-05-06

## 2025-04-29 NOTE — ED NOTES
Patient presented to the Ed with complaints of localized facial swelling under her left eye, states it's been like that for five months, she started noting drainage on Sunday and pulsating pain, states she uses warm compress with out any relief, she denies any fever,chills, no other complaints at this time, states she took blood pressure medicine this morning before she got here, patient is alert, oriented *4, denies needs at this time.

## 2025-04-29 NOTE — ED NOTES
Face cleaned with two by two, no drainage noted at this time, swelling subsided, patient tolerated the procedure well.

## 2025-04-29 NOTE — ED PROVIDER NOTES
Encounter Date: 4/29/2025       History     Chief Complaint   Patient presents with    Facial Swelling     C/o localized facial swelling x5 months. Pt reports white discharge. Applying topical ointment w/o relief.   Pt HTN in triage 200's/100's. Pt reports taking daily HTN meds as prescribed.      44-year-old female with history of hypertension presents to ED with concern of localized swelling and abscess to left side of face.  Patient states she has noticed a small painless bump for nearly 5 months.  She states 3 days ago she noticed area becoming more swollen so she squeezed it, noticing small amount of white discharge.  Later that day area began to swell more with surrounding erythema and worsening pain.  No fevers or chills.  No pain with eye movement.  No other acute complaints at this time    The history is provided by the patient.     Review of patient's allergies indicates:   Allergen Reactions    Ibuprofen Anaphylaxis    Peanut Anaphylaxis     2 years ago last exacerbation, pt states her throat swells but has not been treated in an ER for same.     Past Medical History:   Diagnosis Date    Abnormal Pap smear     Hypertension     Sickle cell anemia     trait     Past Surgical History:   Procedure Laterality Date    HYSTERECTOMY      OOPHORECTOMY       No family history on file.  Social History[1]  Review of Systems   Constitutional:  Negative for chills and fever.   Eyes:  Negative for pain and visual disturbance.   Skin:  Positive for color change and wound.       Physical Exam     Initial Vitals [04/29/25 0755]   BP Pulse Resp Temp SpO2   (!) 200/105 82 16 98 °F (36.7 °C) 99 %      MAP       --         Physical Exam    Vitals reviewed.  Constitutional: Vital signs are normal. She appears well-developed and well-nourished. She is cooperative. She does not have a sickly appearance. She does not appear ill. No distress.   HENT:   Head: Normocephalic and atraumatic.       Small, 1 cm superficial and fluctuant  abscess noted to left side of face.  +mild surrounding erythema.  No significant periorbital edema at this time.  No pain with EOM.   Eyes: EOM are normal.   Neck:   Normal range of motion.  Musculoskeletal:      Cervical back: Normal range of motion.     Neurological: She is alert and oriented to person, place, and time. GCS eye subscore is 4. GCS verbal subscore is 5. GCS motor subscore is 6.   Psychiatric: She has a normal mood and affect. Her speech is normal and behavior is normal.         ED Course   Abscess Aspiration    Date/Time: 4/29/2025 8:24 AM    Performed by: Frank Potter PA-C  Authorized by: Frederic Boucher MD    Consent Done?:  Yes  Universal Protocol:     Verbal consent obtained?: Yes    A time out verifies correct patient, procedure, equipment, support staff and site/side marked as required:   Procedure Details:     Site prepped with:  Betadine    Location of Abscess #1:  Left face    Size of needle #1:  18  Post-procedure:     Patient tolerance:  Patient tolerated the procedure well with no immediate complications    Labs Reviewed - No data to display       Imaging Results    None          Medications - No data to display  Medical Decision Making  Patient presents with concern of left-sided facial abscess.  Afebrile.  Patient in no distress on exam    DDx:  Including but not limited to abscess, cellulitis, wound check    Risk  Prescription drug management.               ED Course as of 04/29/25 0825   Tue Apr 29, 2025   0819 Bedside needle aspiration was performed with purulent drainage.  Patient tolerated well.  Prescription written for Bactrim.  Encouraged to continue monitoring wound healing closely with close outpatient follow-up.  ED return precautions were discussed.  Patient states understanding and agrees with plan [KS]   0820 Patient was noted to have elevated blood pressure while in the ED today.  The patient has no associated signs or symptoms of hypertension.  Patient's blood  pressure is likely elevated due to situation and noncompliance with hypertensive medications.  Advised blood pressure recheck by PCP within 1 week.     [KS]      ED Course User Index  [KS] Frank Potter PA-C                           Clinical Impression:  Final diagnoses:  [L02.01] Facial abscess (Primary)  [I10] Hypertension, unspecified type          ED Disposition Condition    Discharge Stable          ED Prescriptions       Medication Sig Dispense Start Date End Date Auth. Provider    sulfamethoxazole-trimethoprim 800-160mg (BACTRIM DS) 800-160 mg Tab Take 1 tablet by mouth 2 (two) times daily. for 7 days 14 tablet 4/29/2025 5/6/2025 Frank Potter PA-C          Follow-up Information       Follow up With Specialties Details Why Contact Info    Your Doctor                   [1]   Social History  Tobacco Use    Smoking status: Every Day    Smokeless tobacco: Never   Substance Use Topics    Alcohol use: Yes    Drug use: No        Frank Potter PA-C  04/29/25 [id number]

## 2025-04-29 NOTE — DISCHARGE INSTRUCTIONS

## 2025-05-02 ENCOUNTER — HOSPITAL ENCOUNTER (EMERGENCY)
Facility: HOSPITAL | Age: 45
Discharge: HOME | End: 2025-05-02
Attending: EMERGENCY MEDICINE
Payer: MEDICAID

## 2025-05-02 VITALS
HEART RATE: 62 BPM | SYSTOLIC BLOOD PRESSURE: 195 MMHG | BODY MASS INDEX: 30.18 KG/M2 | WEIGHT: 164 LBS | TEMPERATURE: 98 F | RESPIRATION RATE: 18 BRPM | DIASTOLIC BLOOD PRESSURE: 93 MMHG | OXYGEN SATURATION: 99 % | HEIGHT: 62 IN

## 2025-05-02 DIAGNOSIS — L02.01 FACIAL ABSCESS: Primary | ICD-10-CM

## 2025-05-02 DIAGNOSIS — T50.905A ADVERSE EFFECT OF DRUG, INITIAL ENCOUNTER: ICD-10-CM

## 2025-05-02 DIAGNOSIS — I10 HYPERTENSION, UNSPECIFIED TYPE: ICD-10-CM

## 2025-05-02 LAB
ABSOLUTE EOSINOPHIL (OHS): 0.47 K/UL
ABSOLUTE MONOCYTE (OHS): 0.55 K/UL (ref 0.3–1)
ABSOLUTE NEUTROPHIL COUNT (OHS): 4.97 K/UL (ref 1.8–7.7)
ALBUMIN SERPL BCP-MCNC: 3.9 G/DL (ref 3.5–5.2)
ALP SERPL-CCNC: 118 UNIT/L (ref 40–150)
ALT SERPL W/O P-5'-P-CCNC: 40 UNIT/L (ref 10–44)
ANION GAP (OHS): 10 MMOL/L (ref 8–16)
AST SERPL-CCNC: 34 UNIT/L (ref 11–45)
BASOPHILS # BLD AUTO: 0.03 K/UL
BASOPHILS NFR BLD AUTO: 0.4 %
BILIRUB SERPL-MCNC: 0.5 MG/DL (ref 0.1–1)
BUN SERPL-MCNC: 8 MG/DL (ref 6–20)
CALCIUM SERPL-MCNC: 9.6 MG/DL (ref 8.7–10.5)
CHLORIDE SERPL-SCNC: 108 MMOL/L (ref 95–110)
CO2 SERPL-SCNC: 21 MMOL/L (ref 23–29)
CREAT SERPL-MCNC: 0.9 MG/DL (ref 0.5–1.4)
CTP QC/QA: YES
CTP QC/QA: YES
ERYTHROCYTE [DISTWIDTH] IN BLOOD BY AUTOMATED COUNT: 12.2 % (ref 11.5–14.5)
GFR SERPLBLD CREATININE-BSD FMLA CKD-EPI: >60 ML/MIN/1.73/M2
GLUCOSE SERPL-MCNC: 89 MG/DL (ref 70–110)
HCT VFR BLD AUTO: 41.7 % (ref 37–48.5)
HGB BLD-MCNC: 14.5 GM/DL (ref 12–16)
HOLD SPECIMEN: NORMAL
HOLD SPECIMEN: NORMAL
IMM GRANULOCYTES # BLD AUTO: 0.02 K/UL (ref 0–0.04)
IMM GRANULOCYTES NFR BLD AUTO: 0.3 % (ref 0–0.5)
LYMPHOCYTES # BLD AUTO: 1.18 K/UL (ref 1–4.8)
MCH RBC QN AUTO: 32.7 PG (ref 27–31)
MCHC RBC AUTO-ENTMCNC: 34.8 G/DL (ref 32–36)
MCV RBC AUTO: 94 FL (ref 82–98)
NUCLEATED RBC (/100WBC) (OHS): 0 /100 WBC
PLATELET # BLD AUTO: 237 K/UL (ref 150–450)
PMV BLD AUTO: 9.1 FL (ref 9.2–12.9)
POC MOLECULAR INFLUENZA A AGN: NEGATIVE
POC MOLECULAR INFLUENZA B AGN: NEGATIVE
POTASSIUM SERPL-SCNC: 4 MMOL/L (ref 3.5–5.1)
PROT SERPL-MCNC: 7.7 GM/DL (ref 6–8.4)
RBC # BLD AUTO: 4.44 M/UL (ref 4–5.4)
RELATIVE EOSINOPHIL (OHS): 6.5 %
RELATIVE LYMPHOCYTE (OHS): 16.3 % (ref 18–48)
RELATIVE MONOCYTE (OHS): 7.6 % (ref 4–15)
RELATIVE NEUTROPHIL (OHS): 68.9 % (ref 38–73)
SARS-COV-2 RDRP RESP QL NAA+PROBE: NEGATIVE
SODIUM SERPL-SCNC: 139 MMOL/L (ref 136–145)
WBC # BLD AUTO: 7.22 K/UL (ref 3.9–12.7)

## 2025-05-02 PROCEDURE — 25000003 PHARM REV CODE 250

## 2025-05-02 PROCEDURE — 87502 INFLUENZA DNA AMP PROBE: CPT

## 2025-05-02 PROCEDURE — 85025 COMPLETE CBC W/AUTO DIFF WBC: CPT

## 2025-05-02 PROCEDURE — 99284 EMERGENCY DEPT VISIT MOD MDM: CPT | Mod: 25

## 2025-05-02 PROCEDURE — 36415 COLL VENOUS BLD VENIPUNCTURE: CPT

## 2025-05-02 PROCEDURE — 80053 COMPREHEN METABOLIC PANEL: CPT

## 2025-05-02 PROCEDURE — 87635 SARS-COV-2 COVID-19 AMP PRB: CPT

## 2025-05-02 RX ORDER — CLINDAMYCIN HYDROCHLORIDE 150 MG/1
300 CAPSULE ORAL 4 TIMES DAILY
Qty: 56 CAPSULE | Refills: 0 | Status: SHIPPED | OUTPATIENT
Start: 2025-05-02 | End: 2025-05-09

## 2025-05-02 RX ORDER — ACETAMINOPHEN 325 MG/1
650 TABLET ORAL
Status: COMPLETED | OUTPATIENT
Start: 2025-05-02 | End: 2025-05-02

## 2025-05-02 RX ORDER — CLONIDINE HYDROCHLORIDE 0.1 MG/1
0.1 TABLET ORAL
Status: COMPLETED | OUTPATIENT
Start: 2025-05-02 | End: 2025-05-02

## 2025-05-02 RX ADMIN — CLONIDINE HYDROCHLORIDE 0.1 MG: 0.1 TABLET ORAL at 09:05

## 2025-05-02 RX ADMIN — ACETAMINOPHEN 650 MG: 325 TABLET ORAL at 11:05

## 2025-05-02 NOTE — ED NOTES
Patient reports having a facial abscess drained ~Tuesday. Was started on Bactrim that night, started having itching and hives to body so stopped taking the Rx. Last benadryl was yesterday. Patient is also complaining of congestion and loss of taste. NAD noted.

## 2025-05-02 NOTE — ED NOTES
Unable to locate [facility] istat machine for creatinine at this time. Notified provider. CMP was sent to lab. Will run if machine is found. Otherwise will wait for CMP to result.

## 2025-05-02 NOTE — ED NOTES
ER provider reports OK to discharge patient with 195/93 BP, pt to follow up with her PCP and cardiologist

## 2025-05-02 NOTE — DISCHARGE INSTRUCTIONS
Thank you for letting me care for you today - it was nice to meet you and I hope you feel better soon.     Our goal at [facility] is to always give you outstanding care and exceptional service. You may receive a survey by mail or email in the next week about your experience in our ED. We would greatly appreciate you completing and returning the survey. Your feedback provides us with a way to recognize our staff who give very good care and it helps us learn how to improve when your experience was below our aspiration of excellence.     All the best,     Usha Noyola PA-C  Emergency Department Physician Assistant  [facility]

## 2025-05-02 NOTE — ED PROVIDER NOTES
Encounter Date: 5/2/2025       History     Chief Complaint   Patient presents with    Multiple Complaints     Facial abscess drained ~Tuesday, started bactrim and started having diffuse body rash and itching that same night. Stopped taking bactrim. Last dose of benadryl yesterday. NAD noted. Patient also complaining of congestion and loss of taste.     Patient is a 44-year-old female with a past medical history of hypertension and sickle cell trait presenting to the ED with concerns of facial pressure and headache onset 2 days ago after having a facial abscess drained at a prior ED visit.  The abscess was on the left side of her face beneath the periorbital area.  She was then placed on Bactrim DS and started experiencing a diffuse rash after beginning the medication.  She then stopped taking the Bactrim has been using Zyrtec and Benadryl for the rash.  She is now experiencing facial pressure and frontal headache.  She also complains of congestion, loss of taste, and rhinorrhea.  She denies fever, chills, chest pain, shortness of breath, abdominal pain, skin sloughing, or sore throat.    The history is provided by the patient. No  was used.     Review of patient's allergies indicates:   Allergen Reactions    Ibuprofen Anaphylaxis    Peanut Anaphylaxis     2 years ago last exacerbation, pt states her throat swells but has not been treated in an ER for same.    Bactrim [sulfamethoxazole-trimethoprim] Rash     Past Medical History:   Diagnosis Date    Abnormal Pap smear     Hypertension     Sickle cell anemia     trait     Past Surgical History:   Procedure Laterality Date    HYSTERECTOMY      OOPHORECTOMY       No family history on file.  Social History[1]  Review of Systems   Constitutional:  Negative for chills and fever.   HENT:  Positive for congestion, rhinorrhea, sinus pressure and sinus pain. Negative for ear pain, facial swelling, mouth sores, sore throat and trouble swallowing.     Respiratory:  Negative for shortness of breath.    Cardiovascular:  Negative for chest pain.   Gastrointestinal:  Negative for abdominal pain.   Genitourinary:  Negative for dysuria.   Skin:  Positive for rash. Negative for wound.   Neurological:  Positive for headaches.   All other systems reviewed and are negative.      Physical Exam     Initial Vitals [05/02/25 0801]   BP Pulse Resp Temp SpO2   (!) 206/110 77 18 98.2 °F (36.8 °C) 97 %      MAP       --         Physical Exam    Vitals reviewed.  Constitutional: She appears well-developed and well-nourished. She is not diaphoretic.  Non-toxic appearance. No distress.   HENT:   Head: Normocephalic and atraumatic. Head is without right periorbital erythema and without left periorbital erythema.   Nose: Nose normal. Mouth/Throat: No oropharyngeal exudate.   No significant swelling, erythema, or warmth to left side of face where needle aspiration was performed. Mild tenderness.   Eyes: Conjunctivae and EOM are normal. Right eye exhibits no discharge. Left eye exhibits no discharge.   Neck: Neck supple.   Normal range of motion.  Cardiovascular:  Normal rate.           Pulmonary/Chest: Breath sounds normal.   Abdominal: Abdomen is soft. She exhibits no distension. There is no abdominal tenderness.   Musculoskeletal:         General: Normal range of motion.      Cervical back: Normal range of motion and neck supple.     Neurological: She is alert and oriented to person, place, and time.   Skin: Skin is warm. Capillary refill takes less than 2 seconds. Rash noted.   Numerous papular lesions noted over legs, groin, buttocks, and arms that the patient notes are itchy.  Non-tender, non-pustular, no warmth or swelling present.    Psychiatric: She has a normal mood and affect. Her behavior is normal. Judgment and thought content normal.         ED Course   Procedures  Labs Reviewed   COMPREHENSIVE METABOLIC PANEL - Abnormal       Result Value    Sodium 139      Potassium 4.0       Chloride 108      CO2 21 (*)     Glucose 89      BUN 8      Creatinine 0.9      Calcium 9.6      Protein Total 7.7      Albumin 3.9      Bilirubin Total 0.5            AST 34      ALT 40      Anion Gap 10      eGFR >60     CBC WITH DIFFERENTIAL - Abnormal    WBC 7.22      RBC 4.44      HGB 14.5      HCT 41.7      MCV 94      MCH 32.7 (*)     MCHC 34.8      RDW 12.2      Platelet Count 237      MPV 9.1 (*)     Nucleated RBC 0      Neut % 68.9      Lymph % 16.3 (*)     Mono % 7.6      Eos % 6.5      Basophil % 0.4      Imm Grans % 0.3      Neut # 4.97      Lymph # 1.18      Mono # 0.55      Eos # 0.47      Baso # 0.03      Imm Grans # 0.02     CBC W/ AUTO DIFFERENTIAL    Narrative:     The following orders were created for panel order CBC auto differential.  Procedure                               Abnormality         Status                     ---------                               -----------         ------                     CBC with Differential[[id number]]        Abnormal            Final result                 Please view results for these tests on the individual orders.   EXTRA TUBES    Narrative:     The following orders were created for panel order EXTRA TUBES.  Procedure                               Abnormality         Status                     ---------                               -----------         ------                     Light Blue Top Hold[[id number]]                              Final result               Gold Top Hold[[id number]]                                    Final result                 Please view results for these tests on the individual orders.   LIGHT BLUE TOP HOLD    Extra Tube Hold for add-ons.     GOLD TOP HOLD    Extra Tube Hold for add-ons.     SARS-COV-2 RDRP GENE    POC Rapid COVID Negative       Acceptable Yes     POCT INFLUENZA A/B MOLECULAR    POC Molecular Influenza A Ag Negative      POC Molecular Influenza B Ag Negative        Acceptable Yes            Imaging Results              CT Maxillofacial Without Contrast (Final result)  Result time 05/02/25 11:03:16      Final result by Mayo Fang MD (05/02/25 11:03:16)                   Impression:      1. Limited assessment for potential infectious or inflammatory process in the absence of intravenous contrast.  2. No discrete drainable fluid collection or abscess is appreciated.  Visualized airway appears grossly patent.  3. Several periapical lucencies involving maxillary and mandibular teeth, which could indicate periapical abscesses, for which correlation with direct visualization would be recommended.      Electronically signed by: Mayo Fang  Date:    05/02/2025  Time:    11:03               Narrative:    EXAMINATION:  CT MAXILLOFACIAL WITHOUT CONTRAST    CLINICAL HISTORY:  Maxillary/facial abscess;    TECHNIQUE:  Low dose axial images, sagittal and coronal reformations were obtained through the face.  Contrast was not administered.    COMPARISON:  None    FINDINGS:  Comment: Study limited by lack of intravenous contrast.    Treatment changes: No surgical or radiation changes are evident.    Mucosal space: Limited assessment without contrast.  Noting this, no discrete mucosal based lesion is suggested.    Skull base: No definite abnormality.    Lymph nodes: Lymph nodes are not pathologically enlarged by CT size criteria.    Parotid and submandibular glands: No focal lesions are identified.    Thyroid: Partially imaged thyroid gland portions grossly unremarkable.    Vascular structures: Grossly unremarkable by unenhanced technique.    Orbits: Normal.    Visualized intracranial contents: Unremarkable within the limitations of this exam.    Paranasal sinuses: Chronic appearing mucosal thickening of the maxillary sinuses.    Bones: Unremarkable.    Other findings: Periapical lucencies noted involving the bilateral maxillary central incisors, right mandibular 2nd molar, left  mandibular 1st and 2nd premolar teeth and remaining left mandibular molar tooth.                                       Medications   iohexoL (OMNIPAQUE 350) injection 75 mL (has no administration in time range)   cloNIDine tablet 0.1 mg (0.1 mg Oral Given 5/2/25 0957)   acetaminophen tablet 650 mg (650 mg Oral Given 5/2/25 1115)     Medical Decision Making  Differential Diagnosis includes, but is not limited to:  Sepsis, meningitis, cavernous sinus thrombosis, nasal foreign body, otitis media/external, nasal polyp, bacterial sinusitis, allergic rhinitis, influenza, Covid, bacterial/viral pharyngitis, peritonsillar abscess, retropharyngeal abscess, bacterial/viral pneumonia.      Amount and/or Complexity of Data Reviewed  Labs: ordered.  Radiology: ordered.               ED Course as of 05/02/25 1220   Fri May 02, 2025   0904 CBC auto differential(!)  No elevation in WBC. H/H stable [KG]   0905 POCT COVID-19 Rapid Screening [KG]   0905 Temp: 98.2 °F (36.8 °C) [KG]   0905 BP(!): 206/110  History of HTN being managed by PCP; no associated symptoms [KG]   0906 Pulse: 77 [KG]   0906 Resp: 18 [KG]   0906 SpO2: 97 % [KG]   0930 BP(!)(S): 220/120  0.1 mg of clonidine ordered to manage; patient states she took labetalol this morning; no associated symptoms [KG]   0930 POCT Influenza A/B Molecular [KG]   0942 Comprehensive metabolic panel(!)  Creatinine wnl; no significant electrolyte abnormalities [KG]   1003 Patient expressing significant anxiety about receiving IV contrast for CT due to how it made her feel in the past.  Explained the importance of contrast for tissue differentiation to rule out infection but expressed CT can be done without contrast if desired.  Patient would feel more comfortable without contrast.  Order changed. [KG]   1017 CBC auto differential(!)  No elevation in WBC.  H/H stable [KG]   1059 Patient resting comfortably on stretcher upon re-evaluation.  In no acute distress, afebrile.  Repeat blood  pressure is 232/108.  No hypertensive symptoms.  She attributes her elevated blood pressure as being due to discomfort.  She is supposed to be on hydralazine but she does not take it.  Tylenol ordered for pain. [KG]   1112 CT Maxillofacial Without Contrast  FINDINGS:  Comment: Study limited by lack of intravenous contrast.     Treatment changes: No surgical or radiation changes are evident.     Mucosal space: Limited assessment without contrast.  Noting this, no discrete mucosal based lesion is suggested.     Skull base: No definite abnormality.     Lymph nodes: Lymph nodes are not pathologically enlarged by CT size criteria.     Parotid and submandibular glands: No focal lesions are identified.     Thyroid: Partially imaged thyroid gland portions grossly unremarkable.     Vascular structures: Grossly unremarkable by unenhanced technique.     Orbits: Normal.     Visualized intracranial contents: Unremarkable within the limitations of this exam.     Paranasal sinuses: Chronic appearing mucosal thickening of the maxillary sinuses.     Bones: Unremarkable.     Other findings: Periapical lucencies noted involving the bilateral maxillary central incisors, right mandibular 2nd molar, left mandibular 1st and 2nd premolar teeth and remaining left mandibular molar tooth.     Impression:     1. Limited assessment for potential infectious or inflammatory process in the absence of intravenous contrast.  2. No discrete drainable fluid collection or abscess is appreciated.  Visualized airway appears grossly patent.  3. Several periapical lucencies involving maxillary and mandibular teeth, which could indicate periapical abscesses, for which correlation with direct visualization would be recommended. [KG]   1207 BP(!): 195/93  Blood pressure improved and stable for discharge. [KG]   1207 Discussed results with patient.  Flu and COVID test negative. No evidence of abscess or deep space infection on CT.  Some lucencies are noted  around several teeth.  Patient states is aware and is seeing a dentist.  No acute evidence of abscess or infection upon oral exam.  We will switch her antibiotics to clindamycin due to reaction from Bactrim.  Recommended close follow-up with PCP for improvement of symptoms and BP management.  Strict ED return precautions discussed.  Patient verbalized understanding and is in agreement with plan.  All questions answered.  I have discussed this patient with the attending, Dr. Kaur, who is in agreement with ED course and dispo. [KG]      ED Course User Index  [KG] Usha Noyola PA-C                           Clinical Impression:  Final diagnoses:  [L02.01] Facial abscess (Primary)  [I10] Hypertension, unspecified type  [T50.905A] Adverse effect of drug, initial encounter          ED Disposition Condition    Discharge Stable          ED Prescriptions       Medication Sig Dispense Start Date End Date Auth. Provider    clindamycin (CLEOCIN) 150 MG capsule Take 2 capsules (300 mg total) by mouth 4 (four) times daily. for 7 days 56 capsule 5/2/2025 5/9/2025 Usha Noyola PA-C          Follow-up Information       Follow up With Specialties Details Why Contact Info Additional Information    [facility]   [address]  [address]  [place] [address] [phone] Please park in [address] or [address] and use [address]. Take Medical Office Bldg. elevators.               [1]   Social History  Tobacco Use    Smoking status: Every Day    Smokeless tobacco: Never   Substance Use Topics    Alcohol use: Yes    Drug use: No        Usha Noyola PA-C  05/02/25 1221